# Patient Record
Sex: MALE | Race: WHITE | NOT HISPANIC OR LATINO | URBAN - METROPOLITAN AREA
[De-identification: names, ages, dates, MRNs, and addresses within clinical notes are randomized per-mention and may not be internally consistent; named-entity substitution may affect disease eponyms.]

---

## 2023-06-14 ENCOUNTER — HOSPITAL ENCOUNTER (OUTPATIENT)
Dept: DATA CONVERSION | Facility: HOSPITAL | Age: 58
End: 2023-06-14
Attending: PEDIATRICS | Admitting: PEDIATRICS

## 2023-06-14 DIAGNOSIS — Z00.6 ENCOUNTER FOR EXAMINATION FOR NORMAL COMPARISON AND CONTROL IN CLINICAL RESEARCH PROGRAM: ICD-10-CM

## 2023-06-14 DIAGNOSIS — E10.9 TYPE 1 DIABETES MELLITUS WITHOUT COMPLICATIONS (MULTI): ICD-10-CM

## 2024-02-05 DIAGNOSIS — Z00.6 RESEARCH STUDY PATIENT: Primary | ICD-10-CM

## 2024-06-11 ENCOUNTER — HOSPITAL ENCOUNTER (OUTPATIENT)
Dept: RESEARCH | Facility: HOSPITAL | Age: 59
Discharge: HOME | End: 2024-06-11

## 2024-06-11 ENCOUNTER — APPOINTMENT (OUTPATIENT)
Dept: CARDIAC REHAB | Facility: HOSPITAL | Age: 59
End: 2024-06-11

## 2024-06-11 ENCOUNTER — DOCUMENTATION (OUTPATIENT)
Dept: RESEARCH | Age: 59
End: 2024-06-11

## 2024-06-11 VITALS
DIASTOLIC BLOOD PRESSURE: 71 MMHG | SYSTOLIC BLOOD PRESSURE: 138 MMHG | TEMPERATURE: 97.7 F | OXYGEN SATURATION: 97 % | BODY MASS INDEX: 32.14 KG/M2 | HEIGHT: 64 IN | RESPIRATION RATE: 16 BRPM | WEIGHT: 188.27 LBS | HEART RATE: 64 BPM

## 2024-06-11 DIAGNOSIS — E10.9 TYPE 1 DIABETES MELLITUS WITHOUT COMPLICATION (MULTI): Primary | ICD-10-CM

## 2024-06-11 LAB
HOLD SPECIMEN: NORMAL

## 2024-06-11 NOTE — RESEARCH NOTES
"DCRU RESEARCH CLINICAL VISIT NOTE  Study Name: [facility]  IRB#: [id number]  DCRU#: [id number]  Protocol Version Dated: Protocol [id number]  PI: Aj    Time point: Alternate Year Assessments    Encounter Date: 06/11/2024  Encounter Time:  9:30 AM EDT  Encounter Department: [facility] HEMATOLOGY AND ONCOLOGY     #1    Phone Pager   Inez Birmingham [phone]     #2 Phone Pager          [facility] ROOM 11 [facility]   Aguila Cantrell is a 59 y.o. male and is here for a Research clinical visit.    Allergies: No Known Allergies    Study Regimen and Dosing   The [facility] study is a follow-up of the subjects enrolled in the Diabetes Control & Complications Trial (DCCT).  Subjects are seen yearly for evaluation of their diabetes complications and level of glucose control. The DCRU will see subjects annually alternating between a renal collection visit and a fasting lipid visit.     Admission and Prior to Starting Study Activities   Obtain weight in kilograms- with shoes off & heavy items removed   Obtain height in centimeters- with shoes off  If patient has symptomatic hypoglycemia that must be treated with food, treat and then contact the coordinator for instructions  Participant may take own meds after testing completed  Coordinator may perform ECG with study machine     Dietary Guidelines   [facility] - regular diet, no concentrated carbohydrates  Meal may be served after blood draw       Objective   Vital Signs:   Vitals:    06/11/24 0920   BP: 138/71   Pulse: 64   Resp: 16   Temp: 36.5 °C (97.7 °F)   TempSrc: Oral   SpO2: 97%   Weight: 85.4 kg (188 lb 4.4 oz)   Height: 1.622 m (5' 3.86\")       ASSESSMENT and PLAN:  Problem List Items Addressed This Visit    None  Visit Diagnoses       Type 1 diabetes mellitus without complication (Multi)    -  Primary    Relevant Orders    Research collection: 2mL Lavender EDTA - " Research Collect    Research collection: 2mL Lavender EDTA - Research Collect    Research collection: 3mL Red Top - Clinical Collect    Research collection: 2mL Lavender EDTA - Research Collect    Research collection: 2mL Lavender EDTA - Research Collect    Research collection: 3mL Red Top - Clinical Collect            Medications as of the completion of today's visit:      Orders placed during today's visit:  Orders Placed This Encounter   Procedures    Research collection: 2mL Lavender EDTA - Research Collect     Standing Status:   Future     Number of Occurrences:   1     Standing Expiration Date:   2/5/2025     Order Specific Question:   Release result to MyChart     Answer:   Manual release only [2]     Order Specific Question:   Reason for preventing immediate release     Answer:   Patient request [1]     Order Specific Question:   Additional details for preventing immediate release     Answer:   labs for research    Research collection: 2mL Lavender EDTA - Research Collect     Standing Status:   Future     Number of Occurrences:   1     Standing Expiration Date:   2/5/2025     Order Specific Question:   Release result to MyChart     Answer:   Manual release only [2]     Order Specific Question:   Reason for preventing immediate release     Answer:   Patient request [1]     Order Specific Question:   Additional details for preventing immediate release     Answer:   labs for research    Research collection: 3mL Red Top - Clinical Collect     Standing Status:   Future     Number of Occurrences:   1     Standing Expiration Date:   2/5/2025    Research collection: 2mL Lavender EDTA - Research Collect     Standing Status:   Standing     Number of Occurrences:   1     Order Specific Question:   Release result to MyChart     Answer:   Manual release only [2]     Order Specific Question:   Reason for preventing immediate release     Answer:   Patient request [1]     Order Specific Question:   Additional details for  preventing immediate release     Answer:   labs for research    Research collection: 2mL Lavender EDTA - Research Collect     Standing Status:   Standing     Number of Occurrences:   1     Order Specific Question:   [facility]     Answer:   Manual release only [2]     Order Specific Question:   Reason for preventing immediate release     Answer:   Patient request [1]     Order Specific Question:   Additional details for preventing immediate release     Answer:   labs for research    Research collection: 3mL Red Top - Clinical Collect     Standing Status:   Standing     Number of Occurrences:   1       05-94-19. - EDIC    Patient has no adverse events documented in the Research Adverse Events activity.       Study Specific Instructions and Documentation   Snapshot of performable actions in the order performed:  Vitals  Labs  Blood Glucose  Meal   Blood Glucose-if indicated by coordinator     Vital Signs   Document following Vitals:  BP    HR      Temperature      Pulse Oximetry      Respirations          Research Correlatives:   Confirm following to orders placed:   Time point Specimen Test Volume Tube Handling Draw Time     Any time after Vitals    Whole Blood 2 x 2 mL  EDTA Invert to mix, Ambient   0921    Serum 3 mL  Red Top Serum Ambient      *Labs draw from 23G butterfly from right AC*    Blood Glucose   Use DCRU Glucometer.  Inform participant of result so they can take insulin dose    Post Blood draw blood glucose: N/A   Optional Pre Discharge blood glucose: 155     Discharge Instructions   Patient may be discharged to home after study requirements completed.     Shawnee Parson RN  06/11/24

## 2024-06-11 NOTE — RESEARCH NOTES
Aguila Cantrell was seen in the DCRU for his EDIC Study year 31 visit.  His CPET has been cancelled; all other study procedures were completed.    EDIC Study year 32 visit planned for June 2025.

## 2025-03-27 DIAGNOSIS — Z00.6 RESEARCH SUBJECT: Primary | ICD-10-CM

## 2025-06-30 ENCOUNTER — APPOINTMENT (OUTPATIENT)
Dept: RESEARCH | Facility: HOSPITAL | Age: 60
End: 2025-06-30